# Patient Record
Sex: MALE | Race: WHITE | Employment: OTHER | URBAN - METROPOLITAN AREA
[De-identification: names, ages, dates, MRNs, and addresses within clinical notes are randomized per-mention and may not be internally consistent; named-entity substitution may affect disease eponyms.]

---

## 2018-09-10 ENCOUNTER — OFFICE VISIT (OUTPATIENT)
Dept: CARDIOLOGY CLINIC | Age: 73
End: 2018-09-10

## 2018-09-10 VITALS
SYSTOLIC BLOOD PRESSURE: 138 MMHG | RESPIRATION RATE: 18 BRPM | OXYGEN SATURATION: 97 % | HEART RATE: 63 BPM | DIASTOLIC BLOOD PRESSURE: 70 MMHG | WEIGHT: 166.8 LBS | BODY MASS INDEX: 24.71 KG/M2 | HEIGHT: 69 IN

## 2018-09-10 DIAGNOSIS — R06.09 DOE (DYSPNEA ON EXERTION): Primary | ICD-10-CM

## 2018-09-10 DIAGNOSIS — Z76.89 ENCOUNTER TO ESTABLISH CARE: ICD-10-CM

## 2018-09-10 DIAGNOSIS — N40.0 BENIGN PROSTATIC HYPERPLASIA, UNSPECIFIED WHETHER LOWER URINARY TRACT SYMPTOMS PRESENT: Chronic | ICD-10-CM

## 2018-09-10 DIAGNOSIS — I10 ESSENTIAL HYPERTENSION: Chronic | ICD-10-CM

## 2018-09-10 NOTE — PROGRESS NOTES
1. Have you been to the ER, urgent care clinic since your last visit? Hospitalized since your last visit? YES, PATIENT 1ST AUGUST 2018. 2. Have you seen or consulted any other health care providers outside of the [facility] since your last visit? Include any pap smears or colon screening. NO 
 
NEW PATIENT. C/O SOB WHEN SWIMMING.

## 2018-09-10 NOTE — PROGRESS NOTES
[id number] [facility], [address] [place] [address] Ne, [address] [facility]  [phone] Subjective:  
  
Ronnie Miller is a 68 y.o. male with pmhx HTN, BPH, unknown lipid panel is here to establish care and further evaluation of mcgee. States he restarted swimming and unable to swim across the pool without getting shortwinded. Initially presented to [facility] 8[facility]14/18 c/o of MCGEE. EKG showed SB, CXR with possible scarring RLL, lytes/cbc all WNL. He now reports being able to swim for an hr, 4 x a day with no problem. The patient denies chest pain/ shortness of breath, orthopnea, PND, LE edema, palpitations, syncope, or presyncope. Cardiac risk factors: HTN, age, M, unknown lipid panel Hx smoking: denies  Alcohol: occasional   Illegal drug use: denies Family hx: mother:cancer  Father: cancer  Siblings: cancer Patient Active Problem List  
 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 
Past Medical History:  
Diagnosis Date  Arthritis  BPH (benign prostatic hypertrophy)  Cancer [facility])   
 prostate seeds implant  Hematuria   
 secondary to prostate gland surgery  High cholesterol  Hypertension Past Surgical History:  
Procedure Laterality Date  HX HERNIA REPAIR  2012  
 left inguinal hernia repair with mesh  HX HERNIA REPAIR  10/14/2016  
 right inguinal hernia repair by Dr Sanz Monday  HX ORTHOPAEDIC    
 bilateral RC surgery  HX ORTHOPAEDIC    
 left knee A/S  
 HX ORTHOPAEDIC    
 right knee MCL repair  HX OTHER SURGICAL    
 inguinal hernia repair (right)  HX PROSTATECTOMY    
 turp x 2  
 HX TONSILLECTOMY  HX UROLOGICAL    
 vasectomy  HX UROLOGICAL [facility] ARTHROPLASTY  2013 RIGHT HIP Allergies Allergen Reactions  Amoxicillin Rash Patient states he has taken PCN with no adverse reaction  Sulfites Other (comments) SULFITES, NITRATES, SULFATES, NITRITES, NITRIDES, AND SULFIDES CAUSE CHEST BURNING Family History Problem Relation Age of Onset  Cancer Mother   
  lung; smoker  Cancer Father   
  prostate  Cancer Brother   
  prostate Social History Social History  Marital status: SINGLE Spouse name: N/A  
 Number of children: N/A  
 Years of education: N/A Occupational History  Not on file. Social History Main Topics  Smoking status: Never Smoker  Smokeless tobacco: Never Used  Alcohol use 1.0 oz/week 1 Glasses of wine, 1 Cans of beer per week Comment: 2 x's weekly  Drug use: No  
 Sexual activity: Not on file Other Topics Concern  Not on file Social History Narrative Current Outpatient Prescriptions Medication Sig  
 red yeast rice extract 600 mg cap Take 600 mg by mouth now.  IBUPROFEN/DIPHENHYDRAMINE CIT (ADVIL PM PO) Take 1 Tab by mouth nightly.  lisinopril (PRINIVIL, ZESTRIL) 10 mg tablet Take  by mouth daily. No current facility-administered medications for this visit. Review of Symptoms: 
11 systems reviewed, negative other than as stated in the HPI Physical ExamPhysical Exam:   
Vitals:  
 09/10/18 1037 09/10/18 1046 BP: 140/70 138/70 Pulse: 63 Resp: 18 SpO2: 97% Weight: 166 lb 12.8 oz (75.7 kg) Height: 5' 9\" (1.753 m) Body mass index is 24.63 kg/(m^2). General PE Gen:  NAD Mental Status - Alert. General Appearance - Not in acute distress. Chest and Lung Exam  
Inspection: Accessory muscles - No use of accessory muscles in breathing. Auscultation:  
Breath sounds: - Normal.  
Cardiovascular Inspection: Jugular vein - Bilateral - Inspection Normal.  
Palpation/Percussion:  
Apical Impulse: - Normal.  
Auscultation: Rhythm - Regular. Heart Sounds - S1 WNL and S2 WNL. No S3 or S4. Murmurs & Other Heart Sounds: Auscultation of the heart reveals - No Murmurs. Peripheral Vascular Upper Extremity: Inspection - Bilateral - No Cyanotic nailbeds or Digital clubbing. Lower Extremity:  
Palpation: Edema - Bilateral - No edema. Abdomen:   Soft, non-tender, bowel sounds are active. Neuro: A&O times 3, CN and motor grossly WNL Labs:  
No results found for: CHOL, CHOLX, CHLST, CHOLV, 102392, HDL, LDL, LDLC, DLDLP, TGLX, TRIGL, TRIGP, CHHD, CHHDX No results found for: CPK, CPKX, CPX Lab Results Component Value Date/Time Sodium 140 10/10/2016 10:54 AM  
 Potassium 5.4 (H) 10/10/2016 10:54 AM  
 Chloride 103 10/10/2016 10:54 AM  
 CO2 30 10/10/2016 10:54 AM  
 Anion gap 7 10/10/2016 10:54 AM  
 Glucose 88 10/10/2016 10:54 AM  
 BUN 20 10/10/2016 10:54 AM  
 Creatinine 1.18 10/10/2016 10:54 AM  
 BUN/Creatinine ratio 17 10/10/2016 10:54 AM  
 GFR est AA >60 10/10/2016 10:54 AM  
 GFR est non-AA >60 10/10/2016 10:54 AM  
 Calcium 9.3 10/10/2016 10:54 AM  
 Bilirubin, total 0.5 10/10/2016 10:54 AM  
 AST (SGOT) 24 10/10/2016 10:54 AM  
 Alk. phosphatase 52 10/10/2016 10:54 AM  
 Protein, total 6.7 10/10/2016 10:54 AM  
 Albumin 3.8 10/10/2016 10:54 AM  
 Globulin 2.9 10/10/2016 10:54 AM  
 A-G Ratio 1.3 10/10/2016 10:54 AM  
 ALT (SGPT) 33 10/10/2016 10:54 AM  
 
 
EKG: 
SB Assessment: 
 
 Assessment:  
  
1. JOSHI (dyspnea on exertion) 2. Essential hypertension 3. Benign prostatic hyperplasia, unspecified whether lower urinary tract symptoms present 4. Encounter to establish care Orders Placed This Encounter  AMB POC EKG ROUTINE W/ 12 LEADS, INTER & REP Order Specific Question:   Reason for Exam: Answer:   ROUTINE Plan:  
 
 Pt is a 68 y.o. male with pmhx HTN, BPH, unknown lipid panel is here to establish care and further evaluation of joshi. States he restarted swimming and unable to swim across the pool without getting shortwinded. [facility]14/[facility] c/o of JOSHI. EKG showed SB, CXR with possible scarring RLL, lytes/cbc all WNL. He now reports being able to swim for an hr, 4 x a day with no problem. Cardiac risk factors: HTN, age, M, unknown lipid panel 1. JOSHI: d/t risk factors, recommend echo and routine stress test.  He is not interested on pursuing cardiac work up at the moment. 2. HTN: Controlled with lisinopril 3. Unknown lipid panel: He wants to contact PCP for labwork. Continue current care and f/u with us as needed Rosa Brown NP Pt seen and examined in details. Agree with NP A&P. Dayami Cardona MD

## 2024-02-23 ENCOUNTER — OFFICE VISIT (OUTPATIENT)
Age: 79
End: 2024-02-23
Payer: MEDICARE

## 2024-02-23 VITALS
SYSTOLIC BLOOD PRESSURE: 134 MMHG | DIASTOLIC BLOOD PRESSURE: 72 MMHG | OXYGEN SATURATION: 97 % | BODY MASS INDEX: 24.25 KG/M2 | WEIGHT: 160 LBS | RESPIRATION RATE: 18 BRPM | HEIGHT: 68 IN | HEART RATE: 54 BPM

## 2024-02-23 DIAGNOSIS — R41.3 MEMORY DIFFICULTIES: Primary | ICD-10-CM

## 2024-02-23 DIAGNOSIS — R41.840 DIFFICULTY CONCENTRATING: ICD-10-CM

## 2024-02-23 DIAGNOSIS — R68.89 FORGETFULNESS: ICD-10-CM

## 2024-02-23 PROCEDURE — 4004F PT TOBACCO SCREEN RCVD TLK: CPT

## 2024-02-23 PROCEDURE — G8484 FLU IMMUNIZE NO ADMIN: HCPCS

## 2024-02-23 PROCEDURE — 3075F SYST BP GE 130 - 139MM HG: CPT

## 2024-02-23 PROCEDURE — 99205 OFFICE O/P NEW HI 60 MIN: CPT

## 2024-02-23 PROCEDURE — G8420 CALC BMI NORM PARAMETERS: HCPCS

## 2024-02-23 PROCEDURE — G8427 DOCREV CUR MEDS BY ELIG CLIN: HCPCS

## 2024-02-23 PROCEDURE — 3078F DIAST BP <80 MM HG: CPT

## 2024-02-23 PROCEDURE — 1123F ACP DISCUSS/DSCN MKR DOCD: CPT

## 2024-02-23 RX ORDER — LISINOPRIL 10 MG/1
10 TABLET ORAL EVERY MORNING
COMMUNITY
Start: 2023-11-25

## 2024-02-23 RX ORDER — CRANBERRY FRUIT EXTRACT 200 MG
600 CAPSULE ORAL
COMMUNITY

## 2024-02-23 ASSESSMENT — PATIENT HEALTH QUESTIONNAIRE - PHQ9
SUM OF ALL RESPONSES TO PHQ9 QUESTIONS 1 & 2: 0
SUM OF ALL RESPONSES TO PHQ QUESTIONS 1-9: 0
1. LITTLE INTEREST OR PLEASURE IN DOING THINGS: 0
SUM OF ALL RESPONSES TO PHQ QUESTIONS 1-9: 0
2. FEELING DOWN, DEPRESSED OR HOPELESS: 0
SUM OF ALL RESPONSES TO PHQ QUESTIONS 1-9: 0
SUM OF ALL RESPONSES TO PHQ QUESTIONS 1-9: 0

## 2024-02-23 NOTE — PROGRESS NOTES
Chief Complaint   Patient presents with    New Patient    Memory Loss     Numbness in fingers      Vitals:    02/23/24 0920   BP: 134/72   Pulse: 54   Resp: 18   SpO2: 97%

## 2024-03-11 ENCOUNTER — HOSPITAL ENCOUNTER (OUTPATIENT)
Age: 79
Discharge: HOME | End: 2024-03-14
Payer: MEDICARE

## 2024-03-11 DIAGNOSIS — R41.840 DIFFICULTY CONCENTRATING: ICD-10-CM

## 2024-03-11 DIAGNOSIS — R68.89 FORGETFULNESS: ICD-10-CM

## 2024-03-11 DIAGNOSIS — R41.3 MEMORY DIFFICULTIES: ICD-10-CM

## 2024-03-11 PROCEDURE — 6360000004 HC RX CONTRAST MEDICATION: Performed by: RADIOLOGY

## 2024-03-11 PROCEDURE — A9579 GAD-BASE MR CONTRAST NOS,1ML: HCPCS | Performed by: RADIOLOGY

## 2024-03-11 PROCEDURE — 70553 MRI BRAIN STEM W/O & W/DYE: CPT

## 2024-03-11 RX ADMIN — GADOTERIDOL 15 ML: 279.3 INJECTION, SOLUTION INTRAVENOUS at 09:19

## 2024-03-12 ENCOUNTER — TELEPHONE (OUTPATIENT)
Age: 79
End: 2024-03-12

## 2024-03-12 ENCOUNTER — CLINICAL DOCUMENTATION (OUTPATIENT)
Age: 79
End: 2024-03-12

## 2024-03-12 NOTE — RESULT ENCOUNTER NOTE
I reviewed the MRI. There is nothing concerning seen on your scan. No stroke, mass, or bleed. There is some atrophy or \"shrinkage\" that we talked about in the office. And also mild microvascular changes, similar to plaque build up in the brain. Proceed with memory testing.

## 2024-06-04 ENCOUNTER — TELEPHONE (OUTPATIENT)
Age: 79
End: 2024-06-04

## 2024-06-04 NOTE — TELEPHONE ENCOUNTER
Patient requesting scheduled appts on 9/4, 9/6, 9/20 be rescheduled for any times after 2 pm. Please contact

## 2024-06-06 ENCOUNTER — TELEPHONE (OUTPATIENT)
Age: 79
End: 2024-06-06

## 2024-06-06 NOTE — TELEPHONE ENCOUNTER
Please call patient back as he needs all his appointments and testing with Dr. Peres rescheduled. Patient states he cannot do any appointments until 2 pm each day.    Patient states he can try to make 1:30 work but that is the earliest time in the day he can make work.

## 2024-11-22 ENCOUNTER — TELEPHONE (OUTPATIENT)
Dept: PHARMACY | Facility: CLINIC | Age: 79
End: 2024-11-22

## 2024-11-22 NOTE — TELEPHONE ENCOUNTER
[facility] CLINICAL [facility]ARMAC[facility]: ADHERENCE REVIEW  Identified care gap per [place] fills with [facility]: ACE/ARB adherence    Medicare Advantage - MRMA  ACO  Per insurer report, LIS-0 - co-pays are based on tiers and patient is subject to coverage gap.    ASSESSMENT    ACE/ARB ADHERENCE    Insurance Records claims through  24  (Prior Year PDC = 67% - FAILED; YTD PDC = 82%; Potential Fail Date: 24):   LISINOPRIL TAB 10 MG last filled on 08.10.24 for 90 day supply. Next refill due: 24    Prescribed si tablet/capsule daily    Per Reconcile Dispense History and Insurer Portal: last filled on 08.10.24 for 90 day supply.     Per [facility]: Billed through [place]. 2 refills remaining. will get  90 day supply ready to  since past due.    BP Readings from Last 3 Encounters:   24 134/72     CrCl cannot be calculated (No successful lab value found.).  No results found for: \"CREATININE\"  No results found for: \"K\"    PLAN  The following are interventions that have been identified:   Patient overdue refilling LISINOPRIL TAB 10 MG and active on home medication list.   Refill/s of LISINOPRIL TAB 10 MG READY to  at patient's pharmacy [facility].  Patient eligible for 100 day supply    Outreach:  Pharmacy:  [facility] will fill 90 day supply of LISINOPRIL TAB 10 MG  today 24 .    Patient:  Letter sent to patient.   I contacted [facility] to confirm they have refills on file, and they do, so they are processing a 90 day supply since it is due for a refill.  This medication is filled and ready for you at:   [id number] [address] - P [phone] - F [phone]   With [facility], you can receive a 100-day (three month) supply of prescription medications for the same copay as a 30-day (one month) supply. Please request 100-day prescriptions from your provider to maximize your insurance benefits.  [employer]

## 2024-11-22 NOTE — TELEPHONE ENCOUNTER
Return call from patient regarding adherence message below.  Verified patient takes LISINOPRIL TAB 10 MG l daily.  Patient states he intentionally skipped doses in the beginning (a few years ago now) \"in case it was off the market or couldn't get anymore\".  Has surplus of more than 90 tablets at home now.  Full 90 plus another 10 in this latest bottle.    Patient states he only skips on very few occasions now.  Twice in a month.  He believes.    Ana María Robison [facility].   Population Health Clinical   [facility]  Toll free: [phone] Option 1